# Patient Record
Sex: MALE | Race: WHITE | NOT HISPANIC OR LATINO | Employment: FULL TIME | URBAN - METROPOLITAN AREA
[De-identification: names, ages, dates, MRNs, and addresses within clinical notes are randomized per-mention and may not be internally consistent; named-entity substitution may affect disease eponyms.]

---

## 2024-01-28 ASSESSMENT — PROMIS GLOBAL HEALTH SCALE
CARRYOUT_PHYSICAL_ACTIVITIES: COMPLETELY
RATE_QUALITY_OF_LIFE: EXCELLENT
CARRYOUT_SOCIAL_ACTIVITIES: EXCELLENT
RATE_MENTAL_HEALTH: EXCELLENT
EMOTIONAL_PROBLEMS: NEVER
RATE_AVERAGE_PAIN: 1
RATE_PHYSICAL_HEALTH: EXCELLENT
RATE_GENERAL_HEALTH: EXCELLENT
RATE_SOCIAL_SATISFACTION: EXCELLENT

## 2024-01-29 ENCOUNTER — OFFICE VISIT (OUTPATIENT)
Dept: PRIMARY CARE | Facility: CLINIC | Age: 39
End: 2024-01-29
Payer: COMMERCIAL

## 2024-01-29 VITALS
SYSTOLIC BLOOD PRESSURE: 132 MMHG | DIASTOLIC BLOOD PRESSURE: 90 MMHG | RESPIRATION RATE: 16 BRPM | OXYGEN SATURATION: 100 % | HEIGHT: 70 IN | HEART RATE: 92 BPM | BODY MASS INDEX: 24.77 KG/M2 | WEIGHT: 173 LBS

## 2024-01-29 DIAGNOSIS — R79.89 ABNORMAL LIVER FUNCTION TEST: ICD-10-CM

## 2024-01-29 DIAGNOSIS — I10 PRIMARY HYPERTENSION: ICD-10-CM

## 2024-01-29 DIAGNOSIS — Z00.00 ANNUAL PHYSICAL EXAM: Primary | ICD-10-CM

## 2024-01-29 PROBLEM — H53.149 PHOTOPHOBIA: Status: ACTIVE | Noted: 2024-01-29

## 2024-01-29 PROBLEM — R19.7 DIARRHEA: Status: ACTIVE | Noted: 2024-01-29

## 2024-01-29 PROBLEM — Q55.9 TESTICULAR ASYMMETRY: Status: ACTIVE | Noted: 2024-01-29

## 2024-01-29 PROBLEM — G43.909 MIGRAINE HEADACHE: Status: ACTIVE | Noted: 2024-01-29

## 2024-01-29 PROBLEM — S44.91XA NEUROPRAXIA OF RIGHT UPPER EXTREMITY: Status: ACTIVE | Noted: 2018-01-23

## 2024-01-29 PROBLEM — N50.9 TESTICULAR ABNORMALITY: Status: ACTIVE | Noted: 2024-01-29

## 2024-01-29 PROBLEM — S06.0XAA HEAD CONCUSSION: Status: ACTIVE | Noted: 2024-01-29

## 2024-01-29 PROBLEM — R56.9 SEIZURE (MULTI): Status: ACTIVE | Noted: 2024-01-29

## 2024-01-29 PROBLEM — S00.93XA HEAD CONTUSION: Status: ACTIVE | Noted: 2024-01-29

## 2024-01-29 PROBLEM — J02.9 SORE THROAT: Status: ACTIVE | Noted: 2024-01-29

## 2024-01-29 PROBLEM — R56.9 SEIZURE (MULTI): Status: RESOLVED | Noted: 2024-01-29 | Resolved: 2024-01-29

## 2024-01-29 PROBLEM — S40.011A CONTUSION OF RIGHT SHOULDER: Status: ACTIVE | Noted: 2018-01-23

## 2024-01-29 PROBLEM — R11.0 NAUSEA: Status: ACTIVE | Noted: 2024-01-29

## 2024-01-29 PROBLEM — K90.41 NON-CELIAC GLUTEN SENSITIVITY: Status: ACTIVE | Noted: 2024-01-29

## 2024-01-29 PROBLEM — N50.0 TESTICULAR ATROPHY: Status: ACTIVE | Noted: 2024-01-29

## 2024-01-29 PROBLEM — N50.89 TESTICULAR LUMP: Status: ACTIVE | Noted: 2024-01-29

## 2024-01-29 PROBLEM — J32.9 CHRONIC SINUSITIS: Status: ACTIVE | Noted: 2024-01-29

## 2024-01-29 PROBLEM — K90.0 CELIAC DISEASE (HHS-HCC): Status: ACTIVE | Noted: 2024-01-29

## 2024-01-29 PROBLEM — K52.9 ACUTE COLITIS: Status: ACTIVE | Noted: 2024-01-29

## 2024-01-29 PROBLEM — E29.1 TESTICULAR HYPOFUNCTION: Status: ACTIVE | Noted: 2024-01-29

## 2024-01-29 PROBLEM — S16.1XXA CERVICAL STRAIN: Status: ACTIVE | Noted: 2024-01-29

## 2024-01-29 PROCEDURE — 1036F TOBACCO NON-USER: CPT | Performed by: INTERNAL MEDICINE

## 2024-01-29 PROCEDURE — 99213 OFFICE O/P EST LOW 20 MIN: CPT | Performed by: INTERNAL MEDICINE

## 2024-01-29 PROCEDURE — 3080F DIAST BP >= 90 MM HG: CPT | Performed by: INTERNAL MEDICINE

## 2024-01-29 PROCEDURE — 3075F SYST BP GE 130 - 139MM HG: CPT | Performed by: INTERNAL MEDICINE

## 2024-01-29 PROCEDURE — 99395 PREV VISIT EST AGE 18-39: CPT | Performed by: INTERNAL MEDICINE

## 2024-01-29 RX ORDER — LISINOPRIL 10 MG/1
10 TABLET ORAL DAILY
Qty: 30 TABLET | Refills: 2 | Status: SHIPPED | OUTPATIENT
Start: 2024-01-29 | End: 2024-05-07 | Stop reason: SDUPTHER

## 2024-01-29 ASSESSMENT — ENCOUNTER SYMPTOMS
CONSTITUTIONAL NEGATIVE: 1
GASTROINTESTINAL NEGATIVE: 1
NEUROLOGICAL NEGATIVE: 1
ALLERGIC/IMMUNOLOGIC NEGATIVE: 1
ENDOCRINE NEGATIVE: 1
RESPIRATORY NEGATIVE: 1
MUSCULOSKELETAL NEGATIVE: 1
PSYCHIATRIC NEGATIVE: 1
HEMATOLOGIC/LYMPHATIC NEGATIVE: 1
EYES NEGATIVE: 1
CARDIOVASCULAR NEGATIVE: 1

## 2024-01-29 NOTE — ASSESSMENT & PLAN NOTE
HTN - hypertension not well/controlled .Target BP < 130/80  achieved. Educate low salt diet and exercise with weight loss. Educate home self monitoring and diary keeping. Educate risks of elevate blood pressure and benefits of prompt treatment. Start Lisinopril 10 mg daily

## 2024-01-29 NOTE — PROGRESS NOTES
"Subjective   Patient ID: Mauro Garcia Jr. is a 38 y.o. male who presents for Annual Exam (Physical).    HPI     Review of Systems   Constitutional: Negative.    HENT: Negative.     Eyes: Negative.    Respiratory: Negative.     Cardiovascular: Negative.    Gastrointestinal: Negative.    Endocrine: Negative.    Musculoskeletal: Negative.    Skin: Negative.    Allergic/Immunologic: Negative.    Neurological: Negative.    Hematological: Negative.    Psychiatric/Behavioral: Negative.     All other systems reviewed and are negative.      Objective   /90   Pulse 92   Ht 1.778 m (5' 10\")   Wt 78.5 kg (173 lb)   SpO2 100%   BMI 24.82 kg/m²   Blood pressure 132/90, pulse 92, resp. rate 16, height 1.778 m (5' 10\"), weight 78.5 kg (173 lb), SpO2 100 %.   Physical Exam  Vitals and nursing note reviewed.   Constitutional:       Appearance: Normal appearance.   HENT:      Head: Normocephalic and atraumatic.      Right Ear: Tympanic membrane, ear canal and external ear normal.      Left Ear: Tympanic membrane, ear canal and external ear normal. There is no impacted cerumen.      Nose: Nose normal.      Mouth/Throat:      Mouth: Mucous membranes are moist.      Pharynx: Oropharynx is clear.   Eyes:      Extraocular Movements: Extraocular movements intact.      Conjunctiva/sclera: Conjunctivae normal.      Pupils: Pupils are equal, round, and reactive to light.   Cardiovascular:      Rate and Rhythm: Normal rate and regular rhythm.      Pulses: Normal pulses.      Heart sounds: Normal heart sounds. No murmur heard.  Pulmonary:      Effort: Pulmonary effort is normal. No respiratory distress.      Breath sounds: Normal breath sounds. No stridor. No wheezing, rhonchi or rales.   Chest:      Chest wall: No tenderness.   Abdominal:      General: Abdomen is flat. Bowel sounds are normal. There is no distension.      Palpations: Abdomen is soft. There is no mass.      Tenderness: There is no abdominal tenderness. There is no " right CVA tenderness, left CVA tenderness, guarding or rebound.      Hernia: No hernia is present.   Musculoskeletal:         General: Normal range of motion.      Cervical back: Normal range of motion and neck supple.   Skin:     General: Skin is warm.      Capillary Refill: Capillary refill takes less than 2 seconds.   Neurological:      General: No focal deficit present.      Mental Status: He is alert.      Cranial Nerves: No cranial nerve deficit.      Sensory: No sensory deficit.      Motor: No weakness.      Coordination: Coordination normal.      Gait: Gait normal.      Deep Tendon Reflexes: Reflexes normal.   Psychiatric:         Mood and Affect: Mood normal.         Behavior: Behavior normal. Behavior is cooperative.         Thought Content: Thought content normal.         Judgment: Judgment normal.         Assessment/Plan   Problem List Items Addressed This Visit             ICD-10-CM    Abnormal liver function test R79.89     Monitor liver function and monitor US and          Relevant Orders    Comprehensive Metabolic Panel    Annual physical exam - Primary Z00.00     No recent hospitalizations.    All medications reviewed and reconciled by me the provider..  No use of controlled substances or opiates.    Family history, social history reviewed, no changes.    Patient does not smoke.    Patient does not drink.    Patient hydrates adequately daily.  Eats a well-balanced healthy diet.     Exercises adequately including walking and doing weightbearing exercises.    Patient denies any difficulty with memory or cognition.     Denies any difficulty with hearing.  Patient does not wear hearing aids.    No fall risk.  No recent falls.  Denies any difficulty walking.    Patient with no history of depression anxiety, denies any loss of interest, no feeling of sadness, no lack of motivation.    Patient is independent in all ADLs and IADLs.  Independent bathing, dressing, walking.  Takes care of own finances,  shopping and cooking.     End-of-life decision-making power of  reviewed with patient.     Risk Factors Identified During Visit: None.   Influenza: influenza vaccine was previously given.   Pneumovax 23: Pneumovax 23 vaccine was previously given.   Prevnar 13: Prevnar 13 vaccine was previously given.   Shingles Vaccine: Shingles vaccine was previously given.   Prostate cancer screening: Screening is current.   Colorectal Cancer Screening: screening is current.   Abdominal Aortic Aneurysm screening: screening is current.   HIV screening: screening not indicated.       Preventive measures - Recommend ASAP : . Colonoscopy (educate patient risks of colon cancer) refer patient to GI specialist. Ophthalmology and retina exam recommend yearly exams refer patient to an Ophthalmologist. BPH - (Benign Prostatic Hypertrophy) refer patient to an Urologist for rectal exam and PSA check.          Relevant Orders    CBC and Auto Differential    Lipid Panel    TSH with reflex to Free T4 if abnormal    Prostate Specific Antigen    Primary hypertension I10     HTN - hypertension not well/controlled .Target BP < 130/80  achieved. Educate low salt diet and exercise with weight loss. Educate home self monitoring and diary keeping. Educate risks of elevate blood pressure and benefits of prompt treatment. Start Lisinopril 10 mg daily          Relevant Medications    lisinopril 10 mg tablet    Other Relevant Orders    Comprehensive Metabolic Panel

## 2024-01-29 NOTE — ASSESSMENT & PLAN NOTE
No recent hospitalizations.    All medications reviewed and reconciled by me the provider..  No use of controlled substances or opiates.    Family history, social history reviewed, no changes.    Patient does not smoke.    Patient does not drink.    Patient hydrates adequately daily.  Eats a well-balanced healthy diet.     Exercises adequately including walking and doing weightbearing exercises.    Patient denies any difficulty with memory or cognition.     Denies any difficulty with hearing.  Patient does not wear hearing aids.    No fall risk.  No recent falls.  Denies any difficulty walking.    Patient with no history of depression anxiety, denies any loss of interest, no feeling of sadness, no lack of motivation.    Patient is independent in all ADLs and IADLs.  Independent bathing, dressing, walking.  Takes care of own finances, shopping and cooking.     End-of-life decision-making power of  reviewed with patient.     Risk Factors Identified During Visit: None.   Influenza: influenza vaccine was previously given.   Pneumovax 23: Pneumovax 23 vaccine was previously given.   Prevnar 13: Prevnar 13 vaccine was previously given.   Shingles Vaccine: Shingles vaccine was previously given.   Prostate cancer screening: Screening is current.   Colorectal Cancer Screening: screening is current.   Abdominal Aortic Aneurysm screening: screening is current.   HIV screening: screening not indicated.       Preventive measures - Recommend ASAP : . Colonoscopy (educate patient risks of colon cancer) refer patient to GI specialist. Ophthalmology and retina exam recommend yearly exams refer patient to an Ophthalmologist. BPH - (Benign Prostatic Hypertrophy) refer patient to an Urologist for rectal exam and PSA check.

## 2024-01-31 ENCOUNTER — LAB (OUTPATIENT)
Dept: LAB | Facility: LAB | Age: 39
End: 2024-01-31
Payer: COMMERCIAL

## 2024-01-31 DIAGNOSIS — I10 PRIMARY HYPERTENSION: ICD-10-CM

## 2024-01-31 DIAGNOSIS — Z00.00 ANNUAL PHYSICAL EXAM: ICD-10-CM

## 2024-01-31 DIAGNOSIS — R79.89 ABNORMAL LIVER FUNCTION TEST: ICD-10-CM

## 2024-01-31 LAB
ALBUMIN SERPL BCP-MCNC: 4.6 G/DL (ref 3.4–5)
ALP SERPL-CCNC: 64 U/L (ref 33–120)
ALT SERPL W P-5'-P-CCNC: 20 U/L (ref 10–52)
ANION GAP SERPL CALC-SCNC: 10 MMOL/L (ref 10–20)
AST SERPL W P-5'-P-CCNC: 19 U/L (ref 9–39)
BASOPHILS # BLD AUTO: 0.04 X10*3/UL (ref 0–0.1)
BASOPHILS NFR BLD AUTO: 0.6 %
BILIRUB SERPL-MCNC: 0.7 MG/DL (ref 0–1.2)
BUN SERPL-MCNC: 14 MG/DL (ref 6–23)
CALCIUM SERPL-MCNC: 9.8 MG/DL (ref 8.6–10.6)
CHLORIDE SERPL-SCNC: 104 MMOL/L (ref 98–107)
CHOLEST SERPL-MCNC: 126 MG/DL (ref 0–199)
CHOLESTEROL/HDL RATIO: 2.9
CO2 SERPL-SCNC: 29 MMOL/L (ref 21–32)
CREAT SERPL-MCNC: 0.93 MG/DL (ref 0.5–1.3)
EGFRCR SERPLBLD CKD-EPI 2021: >90 ML/MIN/1.73M*2
EOSINOPHIL # BLD AUTO: 0.08 X10*3/UL (ref 0–0.7)
EOSINOPHIL NFR BLD AUTO: 1.3 %
ERYTHROCYTE [DISTWIDTH] IN BLOOD BY AUTOMATED COUNT: 11.9 % (ref 11.5–14.5)
GLUCOSE SERPL-MCNC: 80 MG/DL (ref 74–99)
HCT VFR BLD AUTO: 44.9 % (ref 41–52)
HDLC SERPL-MCNC: 44.1 MG/DL
HGB BLD-MCNC: 15 G/DL (ref 13.5–17.5)
IMM GRANULOCYTES # BLD AUTO: 0.01 X10*3/UL (ref 0–0.7)
IMM GRANULOCYTES NFR BLD AUTO: 0.2 % (ref 0–0.9)
LDLC SERPL CALC-MCNC: 73 MG/DL
LYMPHOCYTES # BLD AUTO: 1.72 X10*3/UL (ref 1.2–4.8)
LYMPHOCYTES NFR BLD AUTO: 27.5 %
MCH RBC QN AUTO: 28.7 PG (ref 26–34)
MCHC RBC AUTO-ENTMCNC: 33.4 G/DL (ref 32–36)
MCV RBC AUTO: 86 FL (ref 80–100)
MONOCYTES # BLD AUTO: 0.43 X10*3/UL (ref 0.1–1)
MONOCYTES NFR BLD AUTO: 6.9 %
NEUTROPHILS # BLD AUTO: 3.97 X10*3/UL (ref 1.2–7.7)
NEUTROPHILS NFR BLD AUTO: 63.5 %
NON HDL CHOLESTEROL: 82 MG/DL (ref 0–149)
NRBC BLD-RTO: 0 /100 WBCS (ref 0–0)
PLATELET # BLD AUTO: 208 X10*3/UL (ref 150–450)
POTASSIUM SERPL-SCNC: 4.3 MMOL/L (ref 3.5–5.3)
PROT SERPL-MCNC: 7 G/DL (ref 6.4–8.2)
PSA SERPL-MCNC: 0.9 NG/ML
RBC # BLD AUTO: 5.22 X10*6/UL (ref 4.5–5.9)
SODIUM SERPL-SCNC: 139 MMOL/L (ref 136–145)
TRIGL SERPL-MCNC: 45 MG/DL (ref 0–149)
TSH SERPL-ACNC: 1.72 MIU/L (ref 0.44–3.98)
VLDL: 9 MG/DL (ref 0–40)
WBC # BLD AUTO: 6.3 X10*3/UL (ref 4.4–11.3)

## 2024-01-31 PROCEDURE — 80061 LIPID PANEL: CPT

## 2024-01-31 PROCEDURE — 80053 COMPREHEN METABOLIC PANEL: CPT

## 2024-01-31 PROCEDURE — 36415 COLL VENOUS BLD VENIPUNCTURE: CPT

## 2024-01-31 PROCEDURE — 84153 ASSAY OF PSA TOTAL: CPT

## 2024-01-31 PROCEDURE — 85025 COMPLETE CBC W/AUTO DIFF WBC: CPT

## 2024-01-31 PROCEDURE — 84443 ASSAY THYROID STIM HORMONE: CPT

## 2024-05-07 DIAGNOSIS — I10 PRIMARY HYPERTENSION: ICD-10-CM

## 2024-05-07 RX ORDER — LISINOPRIL 10 MG/1
10 TABLET ORAL DAILY
Qty: 30 TABLET | Refills: 1 | Status: SHIPPED | OUTPATIENT
Start: 2024-05-07

## 2024-08-01 DIAGNOSIS — I10 PRIMARY HYPERTENSION: ICD-10-CM

## 2024-08-01 RX ORDER — LISINOPRIL 10 MG/1
10 TABLET ORAL DAILY
Qty: 14 TABLET | Refills: 0 | Status: SHIPPED | OUTPATIENT
Start: 2024-08-01

## 2024-08-10 ASSESSMENT — ENCOUNTER SYMPTOMS
BLURRED VISION: 0
ORTHOPNEA: 0
SWEATS: 0
HYPERTENSION: 1
SHORTNESS OF BREATH: 0
NECK PAIN: 0
PALPITATIONS: 0
PND: 0
HEADACHES: 0

## 2024-08-12 ENCOUNTER — APPOINTMENT (OUTPATIENT)
Dept: PRIMARY CARE | Facility: CLINIC | Age: 39
End: 2024-08-12
Payer: COMMERCIAL

## 2024-08-12 VITALS
WEIGHT: 167.55 LBS | DIASTOLIC BLOOD PRESSURE: 75 MMHG | SYSTOLIC BLOOD PRESSURE: 120 MMHG | BODY MASS INDEX: 23.99 KG/M2 | HEART RATE: 72 BPM | HEIGHT: 70 IN | RESPIRATION RATE: 17 BRPM

## 2024-08-12 DIAGNOSIS — E29.1 TESTICULAR HYPOFUNCTION: ICD-10-CM

## 2024-08-12 DIAGNOSIS — I10 PRIMARY HYPERTENSION: Primary | ICD-10-CM

## 2024-08-12 PROBLEM — R79.89 ABNORMAL LIVER FUNCTION TEST: Status: RESOLVED | Noted: 2024-01-29 | Resolved: 2024-08-12

## 2024-08-12 PROBLEM — M54.50 CHRONIC RIGHT-SIDED LOW BACK PAIN: Status: ACTIVE | Noted: 2024-04-24

## 2024-08-12 PROBLEM — S06.0XAA HEAD CONCUSSION: Status: RESOLVED | Noted: 2024-01-29 | Resolved: 2024-08-12

## 2024-08-12 PROBLEM — G89.29 CHRONIC RIGHT-SIDED LOW BACK PAIN: Status: ACTIVE | Noted: 2024-04-24

## 2024-08-12 PROBLEM — K90.41 NON-CELIAC GLUTEN SENSITIVITY: Status: RESOLVED | Noted: 2024-01-29 | Resolved: 2024-08-12

## 2024-08-12 PROBLEM — R19.7 DIARRHEA: Status: RESOLVED | Noted: 2024-01-29 | Resolved: 2024-08-12

## 2024-08-12 PROBLEM — J02.9 SORE THROAT: Status: RESOLVED | Noted: 2024-01-29 | Resolved: 2024-08-12

## 2024-08-12 PROBLEM — K90.0 CELIAC DISEASE (HHS-HCC): Status: RESOLVED | Noted: 2024-01-29 | Resolved: 2024-08-12

## 2024-08-12 PROBLEM — R11.0 NAUSEA: Status: RESOLVED | Noted: 2024-01-29 | Resolved: 2024-08-12

## 2024-08-12 PROBLEM — S16.1XXA CERVICAL STRAIN: Status: RESOLVED | Noted: 2024-01-29 | Resolved: 2024-08-12

## 2024-08-12 PROCEDURE — 1036F TOBACCO NON-USER: CPT | Performed by: INTERNAL MEDICINE

## 2024-08-12 PROCEDURE — 3074F SYST BP LT 130 MM HG: CPT | Performed by: INTERNAL MEDICINE

## 2024-08-12 PROCEDURE — 3008F BODY MASS INDEX DOCD: CPT | Performed by: INTERNAL MEDICINE

## 2024-08-12 PROCEDURE — 99213 OFFICE O/P EST LOW 20 MIN: CPT | Performed by: INTERNAL MEDICINE

## 2024-08-12 PROCEDURE — 3078F DIAST BP <80 MM HG: CPT | Performed by: INTERNAL MEDICINE

## 2024-08-12 RX ORDER — LISINOPRIL 10 MG/1
10 TABLET ORAL DAILY
Qty: 14 TABLET | Refills: 0 | Status: SHIPPED | OUTPATIENT
Start: 2024-08-12

## 2024-08-12 ASSESSMENT — ENCOUNTER SYMPTOMS
EYES NEGATIVE: 1
PND: 0
HEADACHES: 0
NEUROLOGICAL NEGATIVE: 1
PALPITATIONS: 0
BLURRED VISION: 0
ALLERGIC/IMMUNOLOGIC NEGATIVE: 1
MUSCULOSKELETAL NEGATIVE: 1
SWEATS: 0
HEMATOLOGIC/LYMPHATIC NEGATIVE: 1
RESPIRATORY NEGATIVE: 1
ENDOCRINE NEGATIVE: 1
HYPERTENSION: 1
PSYCHIATRIC NEGATIVE: 1
ORTHOPNEA: 0
CARDIOVASCULAR NEGATIVE: 1
GASTROINTESTINAL NEGATIVE: 1
CONSTITUTIONAL NEGATIVE: 1
NECK PAIN: 0
SHORTNESS OF BREATH: 0

## 2024-08-12 NOTE — PROGRESS NOTES
"Subjective   Patient ID: Mauro Garcia Jr. is a 39 y.o. male who presents for Follow-up (Follow up for refills/Inflamed glad on right side of neck).    Hypertension  This is a recurrent problem. The current episode started more than 1 month ago. The problem has been gradually improving since onset. The problem is controlled. Pertinent negatives include no anxiety, blurred vision, chest pain, headaches, malaise/fatigue, neck pain, orthopnea, palpitations, peripheral edema, PND, shortness of breath or sweats. Agents associated with hypertension include decongestants. There are no known risk factors for coronary artery disease. There are no compliance problems.         Review of Systems   Constitutional: Negative.  Negative for malaise/fatigue.   HENT: Negative.     Eyes: Negative.  Negative for blurred vision.   Respiratory: Negative.  Negative for shortness of breath.    Cardiovascular: Negative.  Negative for chest pain, palpitations, orthopnea and PND.   Gastrointestinal: Negative.    Endocrine: Negative.    Musculoskeletal: Negative.  Negative for neck pain.   Skin: Negative.    Allergic/Immunologic: Negative.    Neurological: Negative.  Negative for headaches.   Hematological: Negative.    Psychiatric/Behavioral: Negative.     All other systems reviewed and are negative.      Objective   Resp 17   Ht 1.778 m (5' 10\")   Wt 76 kg (167 lb 8.8 oz)   BMI 24.04 kg/m²   Resp. rate 17, height 1.778 m (5' 10\"), weight 76 kg (167 lb 8.8 oz).   Blood pressure 120/75, pulse 72, resp. rate 17, height 1.778 m (5' 10\"), weight 76 kg (167 lb 8.8 oz).   Physical Exam  Vitals and nursing note reviewed.   Constitutional:       Appearance: Normal appearance.   HENT:      Head: Normocephalic and atraumatic.      Right Ear: Tympanic membrane, ear canal and external ear normal.      Left Ear: Tympanic membrane, ear canal and external ear normal. There is no impacted cerumen.      Nose: Nose normal.      Mouth/Throat:      Mouth: " Mucous membranes are moist.      Pharynx: Oropharynx is clear.   Eyes:      Extraocular Movements: Extraocular movements intact.      Conjunctiva/sclera: Conjunctivae normal.      Pupils: Pupils are equal, round, and reactive to light.   Cardiovascular:      Rate and Rhythm: Normal rate and regular rhythm.      Pulses: Normal pulses.      Heart sounds: Normal heart sounds. No murmur heard.  Pulmonary:      Effort: Pulmonary effort is normal. No respiratory distress.      Breath sounds: Normal breath sounds. No stridor. No wheezing, rhonchi or rales.   Chest:      Chest wall: No tenderness.   Abdominal:      General: Abdomen is flat. Bowel sounds are normal. There is no distension.      Palpations: Abdomen is soft. There is no mass.      Tenderness: There is no abdominal tenderness. There is no right CVA tenderness, left CVA tenderness, guarding or rebound.      Hernia: No hernia is present.   Musculoskeletal:         General: Normal range of motion.      Cervical back: Normal range of motion and neck supple.   Skin:     General: Skin is warm.      Capillary Refill: Capillary refill takes less than 2 seconds.   Neurological:      General: No focal deficit present.      Mental Status: He is alert.      Cranial Nerves: No cranial nerve deficit.      Sensory: No sensory deficit.      Motor: No weakness.      Coordination: Coordination normal.      Gait: Gait normal.      Deep Tendon Reflexes: Reflexes normal.   Psychiatric:         Mood and Affect: Mood normal.         Behavior: Behavior normal. Behavior is cooperative.         Thought Content: Thought content normal.         Judgment: Judgment normal.         Assessment/Plan   Problem List Items Addressed This Visit             ICD-10-CM    Testicular hypofunction E29.1     Monitor Testosterone level         Primary hypertension - Primary I10     HTN - hypertension not well/controlled .Target BP < 130/80  achieved. Educate low salt diet and exercise with weight loss.  Educate home self monitoring and diary keeping. Educate risks of elevate blood pressure and benefits of prompt treatment. Start Lisinopril 10 mg daily          Relevant Medications    lisinopril 10 mg tablet

## 2024-09-09 DIAGNOSIS — I10 PRIMARY HYPERTENSION: ICD-10-CM

## 2024-09-09 RX ORDER — LISINOPRIL 10 MG/1
10 TABLET ORAL DAILY
Qty: 90 TABLET | Refills: 1 | Status: SHIPPED | OUTPATIENT
Start: 2024-09-09

## 2025-05-22 ENCOUNTER — APPOINTMENT (OUTPATIENT)
Dept: CARDIOLOGY | Facility: HOSPITAL | Age: 40
End: 2025-05-22
Payer: COMMERCIAL

## 2025-05-22 ENCOUNTER — HOSPITAL ENCOUNTER (EMERGENCY)
Facility: HOSPITAL | Age: 40
Discharge: HOME | End: 2025-05-22
Payer: COMMERCIAL

## 2025-05-22 ENCOUNTER — APPOINTMENT (OUTPATIENT)
Dept: RADIOLOGY | Facility: HOSPITAL | Age: 40
End: 2025-05-22
Payer: COMMERCIAL

## 2025-05-22 VITALS
HEART RATE: 83 BPM | BODY MASS INDEX: 25.18 KG/M2 | RESPIRATION RATE: 16 BRPM | HEIGHT: 69 IN | OXYGEN SATURATION: 98 % | DIASTOLIC BLOOD PRESSURE: 105 MMHG | WEIGHT: 170 LBS | SYSTOLIC BLOOD PRESSURE: 128 MMHG | TEMPERATURE: 97.7 F

## 2025-05-22 DIAGNOSIS — I48.0 PAROXYSMAL ATRIAL FIBRILLATION (MULTI): Primary | ICD-10-CM

## 2025-05-22 LAB
ALBUMIN SERPL BCP-MCNC: 4.1 G/DL (ref 3.4–5)
ALP SERPL-CCNC: 62 U/L (ref 33–120)
ALT SERPL W P-5'-P-CCNC: 17 U/L (ref 10–52)
ANION GAP SERPL CALC-SCNC: 12 MMOL/L (ref 10–20)
AST SERPL W P-5'-P-CCNC: 23 U/L (ref 9–39)
BASOPHILS # BLD AUTO: 0.05 X10*3/UL (ref 0–0.1)
BASOPHILS NFR BLD AUTO: 0.9 %
BILIRUB SERPL-MCNC: 0.7 MG/DL (ref 0–1.2)
BODY SURFACE AREA: 1.94 M2
BUN SERPL-MCNC: 21 MG/DL (ref 6–23)
CALCIUM SERPL-MCNC: 8.6 MG/DL (ref 8.6–10.3)
CARDIAC TROPONIN I PNL SERPL HS: 4 NG/L (ref 0–20)
CHLORIDE SERPL-SCNC: 109 MMOL/L (ref 98–107)
CO2 SERPL-SCNC: 24 MMOL/L (ref 21–32)
CREAT SERPL-MCNC: 0.95 MG/DL (ref 0.5–1.3)
D DIMER PPP FEU-MCNC: <215 NG/ML FEU
EGFRCR SERPLBLD CKD-EPI 2021: >90 ML/MIN/1.73M*2
EOSINOPHIL # BLD AUTO: 0.1 X10*3/UL (ref 0–0.7)
EOSINOPHIL NFR BLD AUTO: 1.8 %
ERYTHROCYTE [DISTWIDTH] IN BLOOD BY AUTOMATED COUNT: 11.9 % (ref 11.5–14.5)
GLUCOSE SERPL-MCNC: 89 MG/DL (ref 74–99)
HCT VFR BLD AUTO: 45.1 % (ref 41–52)
HGB BLD-MCNC: 14.9 G/DL (ref 13.5–17.5)
IMM GRANULOCYTES # BLD AUTO: 0.02 X10*3/UL (ref 0–0.7)
IMM GRANULOCYTES NFR BLD AUTO: 0.4 % (ref 0–0.9)
INR PPP: 1 (ref 0.9–1.1)
LYMPHOCYTES # BLD AUTO: 1.89 X10*3/UL (ref 1.2–4.8)
LYMPHOCYTES NFR BLD AUTO: 33.8 %
MAGNESIUM SERPL-MCNC: 2.09 MG/DL (ref 1.6–2.4)
MCH RBC QN AUTO: 28.8 PG (ref 26–34)
MCHC RBC AUTO-ENTMCNC: 33 G/DL (ref 32–36)
MCV RBC AUTO: 87 FL (ref 80–100)
MONOCYTES # BLD AUTO: 0.47 X10*3/UL (ref 0.1–1)
MONOCYTES NFR BLD AUTO: 8.4 %
NEUTROPHILS # BLD AUTO: 3.06 X10*3/UL (ref 1.2–7.7)
NEUTROPHILS NFR BLD AUTO: 54.7 %
NRBC BLD-RTO: 0 /100 WBCS (ref 0–0)
PLATELET # BLD AUTO: 188 X10*3/UL (ref 150–450)
POTASSIUM SERPL-SCNC: 4.4 MMOL/L (ref 3.5–5.3)
PROT SERPL-MCNC: 6.2 G/DL (ref 6.4–8.2)
PROTHROMBIN TIME: 10.6 SECONDS (ref 9.8–12.4)
RBC # BLD AUTO: 5.18 X10*6/UL (ref 4.5–5.9)
SODIUM SERPL-SCNC: 141 MMOL/L (ref 136–145)
TSH SERPL-ACNC: 1.86 MIU/L (ref 0.44–3.98)
WBC # BLD AUTO: 5.6 X10*3/UL (ref 4.4–11.3)

## 2025-05-22 PROCEDURE — 71045 X-RAY EXAM CHEST 1 VIEW: CPT

## 2025-05-22 PROCEDURE — 99285 EMERGENCY DEPT VISIT HI MDM: CPT

## 2025-05-22 PROCEDURE — 85025 COMPLETE CBC W/AUTO DIFF WBC: CPT | Performed by: NURSE PRACTITIONER

## 2025-05-22 PROCEDURE — 2500000001 HC RX 250 WO HCPCS SELF ADMINISTERED DRUGS (ALT 637 FOR MEDICARE OP): Performed by: NURSE PRACTITIONER

## 2025-05-22 PROCEDURE — 85379 FIBRIN DEGRADATION QUANT: CPT | Performed by: NURSE PRACTITIONER

## 2025-05-22 PROCEDURE — 84443 ASSAY THYROID STIM HORMONE: CPT | Performed by: NURSE PRACTITIONER

## 2025-05-22 PROCEDURE — 84484 ASSAY OF TROPONIN QUANT: CPT | Performed by: NURSE PRACTITIONER

## 2025-05-22 PROCEDURE — 71045 X-RAY EXAM CHEST 1 VIEW: CPT | Mod: FOREIGN READ | Performed by: RADIOLOGY

## 2025-05-22 PROCEDURE — 85610 PROTHROMBIN TIME: CPT | Performed by: NURSE PRACTITIONER

## 2025-05-22 PROCEDURE — 93246 EXT ECG>7D<15D RECORDING: CPT

## 2025-05-22 PROCEDURE — 84075 ASSAY ALKALINE PHOSPHATASE: CPT | Performed by: NURSE PRACTITIONER

## 2025-05-22 PROCEDURE — 93005 ELECTROCARDIOGRAM TRACING: CPT

## 2025-05-22 PROCEDURE — 36415 COLL VENOUS BLD VENIPUNCTURE: CPT | Performed by: NURSE PRACTITIONER

## 2025-05-22 PROCEDURE — 83735 ASSAY OF MAGNESIUM: CPT | Performed by: NURSE PRACTITIONER

## 2025-05-22 RX ORDER — NAPROXEN SODIUM 220 MG/1
81 TABLET, FILM COATED ORAL ONCE
Status: COMPLETED | OUTPATIENT
Start: 2025-05-22 | End: 2025-05-22

## 2025-05-22 RX ADMIN — ASPIRIN 81 MG: 81 TABLET, CHEWABLE ORAL at 10:12

## 2025-05-22 ASSESSMENT — COLUMBIA-SUICIDE SEVERITY RATING SCALE - C-SSRS
6. HAVE YOU EVER DONE ANYTHING, STARTED TO DO ANYTHING, OR PREPARED TO DO ANYTHING TO END YOUR LIFE?: NO
1. IN THE PAST MONTH, HAVE YOU WISHED YOU WERE DEAD OR WISHED YOU COULD GO TO SLEEP AND NOT WAKE UP?: NO
2. HAVE YOU ACTUALLY HAD ANY THOUGHTS OF KILLING YOURSELF?: NO

## 2025-05-22 ASSESSMENT — HEART SCORE
TROPONIN: LESS THAN OR EQUAL TO NORMAL LIMIT
HISTORY: SLIGHTLY SUSPICIOUS
HEART SCORE: 2
AGE: <45
ECG: NON-SPECIFIC REPOLARIZATION DISTURBANCE
RISK FACTORS: 1-2 RISK FACTORS

## 2025-05-22 ASSESSMENT — PAIN SCALES - GENERAL: PAINLEVEL_OUTOF10: 2

## 2025-05-22 ASSESSMENT — PAIN - FUNCTIONAL ASSESSMENT: PAIN_FUNCTIONAL_ASSESSMENT: 0-10

## 2025-05-22 ASSESSMENT — PAIN DESCRIPTION - LOCATION: LOCATION: CHEST

## 2025-05-22 ASSESSMENT — PAIN DESCRIPTION - DESCRIPTORS: DESCRIPTORS: DISCOMFORT

## 2025-05-22 ASSESSMENT — PAIN DESCRIPTION - PAIN TYPE: TYPE: ACUTE PAIN

## 2025-05-22 NOTE — ED TRIAGE NOTES
Pt arrives to ED with new onset afib RVR. States his watch woke him up alerting him of a high heart rate. Endorses palpitations, denies CP/SOB. Stopped taking antihyptertensives last year.

## 2025-05-22 NOTE — Clinical Note
Mauro Garcia was seen and treated in our emergency department on 5/22/2025.  He may return to work on 05/23/2025.       If you have any questions or concerns, please don't hesitate to call.      Malcom Majano, APRN-CNP

## 2025-05-22 NOTE — ED PROVIDER NOTES
"HPI   Chief Complaint   Patient presents with    Irregular Heart Beat       39-year-old  he has a history of hypertension but stopped his lisinopril 1 year ago and try to control his blood pressure with diet and exercise presents today with new onset atrial fibs RVR that started at 1 AM this morning.  [facility] and [facility] confirmed atrial fibs RVR at a rate between 150 down to 110 bpm.  There was no ST elevation or depression and there was an occasional PVC.  He states that he was experiencing \"chest pressure\" that woke him up at 1 AM.  He denies cough.  He denies fever.  He denies any chills.  He denies history of smoking.  He only drinks on rare occasion.  He does not use energy drinks.  He has no history of hyperthyroidism.  He denies nausea or vomiting.  He denies becoming diaphoretic.  He denies any recent trauma or fall.  He denies melena or hematochezia.  He was hypertensive when he arrived with a blood pressure 131/104.  He was afebrile.  His pulse ox was 99% he was denying dyspnea or dyspnea on exertion.      History provided by:  Patient and EMS personnel   used: No            Patient History   Medical History[1]  Surgical History[2]  Family History[3]  Social History[4]    Physical Exam   ED Triage Vitals [05/22/25 0753]   Temperature Heart Rate Respirations BP   36.5 °C (97.7 °F) 75 18 (!) 131/104      Pulse Ox Temp Source Heart Rate Source Patient Position   99 % Oral -- --      BP Location FiO2 (%)     -- --       Physical Exam  Constitutional:       Appearance: Normal appearance.   HENT:      Head: Normocephalic and atraumatic.   Cardiovascular:      Rate and Rhythm: Normal rate and regular rhythm.      Pulses: Normal pulses.      Heart sounds: Normal heart sounds.   Pulmonary:      Effort: Pulmonary effort is normal.      Breath sounds: Normal breath sounds.   Abdominal:      General: Abdomen is flat.      Palpations: Abdomen is soft.   Musculoskeletal:   "       General: Normal range of motion.      Cervical back: Normal range of motion and neck supple.   Skin:     General: Skin is warm.      Capillary Refill: Capillary refill takes less than 2 seconds.   Neurological:      General: No focal deficit present.      Mental Status: He is alert and oriented to person, place, and time.   Psychiatric:         Mood and Affect: Mood normal.         Behavior: Behavior normal.           ED Course & MDM   Diagnoses as of 05/22/25 0952   Paroxysmal atrial fibrillation (Multi)                 No data recorded     [place] Coma Scale Score: 15 (05/22/25 0825 : Kyra Kathryn Behnfeldt, RN) HEART Score: 2 (05/22/25 0802 : Malcom Majano, APRN-CNP)                         Medical Decision Making  Patient cardioverted when we brought him in room 3.  His auscultated lung sounds were normal and he was normal S1-S2 and rhythm.  We completed an EKG and it was 87 bpm normal sinus rhythm.  There was no ST elevation or depression QRS was narrow and P waves were tied to the QRS.  A chest x-ray was ordered due to his complaint of chest pressure.  TSH, magnesium, CBC CMP troponin and D-dimer ordered with differentials concerning for electrolyte imbalance, upper respiratory infection the could have brought the atrial fib on, heart damage with a history of cardiomyopathy, and patient is presently not anticoagulated.    Vital signs were monitored and his blood pressure went from 131/104 to 123/90 and then to 114/88.  Heart rate was normal at 79 bpm and he remained in normal sinus rhythm.  He remained afebrile and his pulse ox was 98% on room air.    His TSH was normal.  His troponin was normal.  His metabolic panel was normal.  His magnesium was normal.  His CBC was normal.  D-dimer was undetectable.    Chest x-ray showed no acute findings single AP view of the chest.  There was no cardiomegaly appreciated.  There was no widening mediastinum.    I contacted Dr. Edwardo Ellis on-call cardiology for  consult    I ordered a Holter monitor placed on patient for the next 2 weeks.    I was advised by patient that Dr. Edwardo Ellis also cares for his father, and his father has a history of atrial fib.    CHADVASC score 1, held of on Anticoagulant. Recommended 81 mg aspirin daily    Dr Ellis will get patient into his office in the next 1 to 2 weeks    Amount and/or Complexity of Data Reviewed  Labs: ordered.  Radiology: ordered and independent interpretation performed.  ECG/medicine tests: ordered and independent interpretation performed.     Details: Initial EKG was sent over by the EMS squad with an average heart rate of 125 bpm atrial fibs with RVR occasional PVCs.    Her EKG was completed at 7:50 AM at 87 bpm normal sinus rhythm normal axis normal WV interval normal QT corrected interpreted both by attending and myself no ST elevation or depression.        Procedure  Procedures         [1]   Past Medical History:  Diagnosis Date    Personal history of traumatic brain injury     History of concussion   [2]   Past Surgical History:  Procedure Laterality Date    OTHER SURGICAL HISTORY  09/05/2014    Wrist Surgery    RHINOPLASTY  09/05/2014    Rhinoplasty   [3] No family history on file.  [4]   Social History  Tobacco Use    Smoking status: Never    Smokeless tobacco: Never   Substance Use Topics    Alcohol use: Not Currently    Drug use: Never        Malcom Majano, GAVIN-MARTIN  05/22/25 0952

## 2025-05-22 NOTE — DISCHARGE INSTRUCTIONS
Please take 81 mg chewable aspirin daily.  Dr. Ellis will get you in his office in the next 1 to 2 weeks.  Please keep monitor on for the next 2 weeks.

## 2025-05-23 NOTE — PROGRESS NOTES
"Primary Care Physician: No primary care provider on file.  Date of Visit: 05/27/2025  3:40 PM EDT  Location of visit: [facility] [address]     Chief Complaint:   Hospital follow up     HPI / Summary:   Mauro Garcia Jr. is a 39 y.o. male presents for followup.  He has a history of borderline HTN and paroxysmal atrial fibrillation    His recent history dates back to early 2024 in which he was overweight 200 pounds and began having hypertension.  He was put on lisinopril 10 mg but developed some nausea lightheadedness and dizziness and discontinued this.  He then made radical lifestyle changes including removing processed food, low-salt diet, regular exercise, alcohol cessation and since then has been able to control his blood pressure much better with systolics typically 130s/80s.    On the evening of 5/21/2025 he reported coming home from work, feeling very hungry and decided to have chicken tenders and fresh vegetables about 30 minutes before going to sleep.  Says this is the first processed food he has had in months.  He woke up a few hours later to his Apple watch alarming stating atrial fibrillation.  He thought it might be an accurate so he went back to sleep.  He then woke up a few hours later with persistent atrial fibrillation.  He noted some mild chest abnormality.  No significant heaviness or palpitations.  He then went to work and EKG at the [facility] showed A-fib with RVR and was brought to [facility].  He says while at [facility] about 20 minutes later he spontaneously converted to normal rhythm and felt a \"relief/relaxation\" in his chest.  EKG at that point showed normal sinus rhythm.  Workup in the ER negative including normal TSH, troponin, CMP, D-dimer and CBC.    2-week monitor was placed  Denies any further A-fib episodes and no further alarms from his Apple Watch      I also care for his father who has paroxysmal SVT with a prior ablation, mildly enlarged ascending aorta          Last Cardiology " Tests:      EC25 Normal sinus rhythm         Echo:      Cath:      Stress Test:      Cardiac Imaging:    Past Medical History:  Medical History[1]     Past Surgical History:  Surgical History[2]       Social History:  He reports that he has never smoked. He has never used smokeless tobacco. He reports that he does not currently use alcohol. He reports that he does not use drugs.    Family History:  family history is not on file.      Allergies:  RX Allergies[3]    Outpatient Medications:  No current outpatient medications      Physical Exam:  GENERAL: alert, cooperative, pleasant, in no acute distress  SKIN: warm, dry, no rash.  NECK: no JVD, no STEPHANIE  CARDIAC: Regular rate and rhythm with no rubs, murmurs, or gallops  CHEST: Normal respiratory efforts, lungs clear to auscultation bilaterally.  ABDOMEN: soft, nontender, nondistended  EXTREMITIES: no edema  NEURO: Alert and oriented x 3.  Grossly normal.  Moves all 4 extremities.    Vitals:    25 1522   BP: 122/85   BP Location: Left arm   Pulse: 92   SpO2: 95%   Weight: 74.4 kg (164 lb)     Wt Readings from Last 5 Encounters:   25 77.1 kg (170 lb)   24 76 kg (167 lb 8.8 oz)   24 78.5 kg (173 lb)   20 79.8 kg (176 lb)     Body mass index is 24.22 kg/m².        Last Labs:  CMP:  Recent Labs     25  0757 24  0707 20  0840    139 138   K 4.4 4.3 4.5   * 104 104   CO2 24 29 27   ANIONGAP 12 10 12   BUN 21 14 19   CREATININE 0.95 0.93 0.90   EGFR >90 >90  --    GLUCOSE 89 80 90     Recent Labs     25  0757 24  0707 20  0840   ALBUMIN 4.1 4.6 4.6   ALKPHOS 62 64 55   ALT 17 20 29   AST 23 19 24   BILITOT 0.7 0.7 1.0     CBC:  Recent Labs     25  0757 24  0707 20  0840   WBC 5.6 6.3 5.2   HGB 14.9 15.0 15.5   HCT 45.1 44.9 45.3    208 200   MCV 87 86 84     COAG:   Recent Labs     25  0757   INR 1.0   DDIMERVTE <215     ENDO:  Recent Labs     25  0757  01/31/24  0707 03/19/20  0840   TSH 1.86 1.72 1.84      CARDIAC:   Recent Labs     05/22/25  0757   TROPHS 4     Recent Labs     01/31/24  0707 03/19/20  0840   CHOL 126 148   LDLF  --  90   LDLCALC 73  --    HDL 44.1 48.4   TRIG 45 48             Assessment/Plan     Paroxysmal atrial fibrillation  Single known episode 5/2025 with spontaneous conversion.  Associated with some mild chest discomfort.  Perhaps provoked by high salt diet and processed food which he had essentially eliminated previously.  He has borderline elevated Troponin not requiring any medication so at most his [id number] score is 1.  With relatively low burden to this point, occupation as a  and there may be an increased bleeding risk and jointly decided against anticoagulation at this time.  He can stop taking aspirin.  Recommend a structural evaluation with echocardiogram and we will await final monitor results.  If no untoward findings likely follow-up in about a year or sooner should the need arise.  Blood pressure is also controlled with lifestyle modifications.  I see no need for antihypertensive medications at this point.      Followup Appts:  Future Appointments   Date Time Provider Department Center   6/4/2025  3:00 PM [facility] ECHO/STRESS [id number]           ____________________________________________________________  Edwardo Ellis DO  [facility]  [facility]         [1]   Past Medical History:  Diagnosis Date    Personal history of traumatic brain injury     History of concussion   [2]   Past Surgical History:  Procedure Laterality Date    OTHER SURGICAL HISTORY  09/05/2014    Wrist Surgery    RHINOPLASTY  09/05/2014    Rhinoplasty   [3] No Known Allergies

## 2025-05-24 LAB
ATRIAL RATE: 87 BPM
P AXIS: 47 DEGREES
P OFFSET: 212 MS
P ONSET: 152 MS
PR INTERVAL: 138 MS
Q ONSET: 221 MS
QRS COUNT: 14 BEATS
QRS DURATION: 82 MS
QT INTERVAL: 332 MS
QTC CALCULATION(BAZETT): 399 MS
QTC FREDERICIA: 375 MS
R AXIS: 47 DEGREES
T AXIS: 35 DEGREES
T OFFSET: 387 MS
VENTRICULAR RATE: 87 BPM

## 2025-05-27 ENCOUNTER — OFFICE VISIT (OUTPATIENT)
Dept: CARDIOLOGY | Facility: CLINIC | Age: 40
End: 2025-05-27
Payer: COMMERCIAL

## 2025-05-27 VITALS
WEIGHT: 164 LBS | DIASTOLIC BLOOD PRESSURE: 85 MMHG | BODY MASS INDEX: 24.22 KG/M2 | HEART RATE: 92 BPM | OXYGEN SATURATION: 95 % | SYSTOLIC BLOOD PRESSURE: 122 MMHG

## 2025-05-27 DIAGNOSIS — I48.0 PAROXYSMAL A-FIB (MULTI): Primary | ICD-10-CM

## 2025-05-27 DIAGNOSIS — R03.0 TRANSIENT ELEVATED BLOOD PRESSURE: ICD-10-CM

## 2025-05-27 PROCEDURE — 3079F DIAST BP 80-89 MM HG: CPT | Performed by: INTERNAL MEDICINE

## 2025-05-27 PROCEDURE — 1036F TOBACCO NON-USER: CPT | Performed by: INTERNAL MEDICINE

## 2025-05-27 PROCEDURE — 99204 OFFICE O/P NEW MOD 45 MIN: CPT | Performed by: INTERNAL MEDICINE

## 2025-05-27 PROCEDURE — 3074F SYST BP LT 130 MM HG: CPT | Performed by: INTERNAL MEDICINE

## 2025-05-27 PROCEDURE — 99214 OFFICE O/P EST MOD 30 MIN: CPT | Performed by: INTERNAL MEDICINE

## 2025-06-04 ENCOUNTER — HOSPITAL ENCOUNTER (OUTPATIENT)
Dept: CARDIOLOGY | Facility: CLINIC | Age: 40
Discharge: HOME | End: 2025-06-04
Payer: COMMERCIAL

## 2025-06-04 DIAGNOSIS — I48.91 UNSPECIFIED ATRIAL FIBRILLATION (MULTI): ICD-10-CM

## 2025-06-04 DIAGNOSIS — I48.0 PAROXYSMAL A-FIB (MULTI): ICD-10-CM

## 2025-06-04 LAB
AORTIC VALVE PEAK VELOCITY: 1.37 M/S
AV PEAK GRADIENT: 8 MMHG
AVA (PEAK VEL): 4.44 CM2
EJECTION FRACTION APICAL 4 CHAMBER: 53.1
EJECTION FRACTION: 59 %
LEFT ATRIUM VOLUME AREA LENGTH INDEX BSA: 27.6 ML/M2
LEFT VENTRICLE INTERNAL DIMENSION DIASTOLE: 5.34 CM (ref 3.5–6)
LEFT VENTRICULAR OUTFLOW TRACT DIAMETER: 2.41 CM
MITRAL VALVE E/A RATIO: 1.03
RIGHT VENTRICLE FREE WALL PEAK S': 16.53 CM/S
TRICUSPID ANNULAR PLANE SYSTOLIC EXCURSION: 2.7 CM

## 2025-06-04 PROCEDURE — 93306 TTE W/DOPPLER COMPLETE: CPT

## 2025-06-04 PROCEDURE — 93306 TTE W/DOPPLER COMPLETE: CPT | Performed by: INTERNAL MEDICINE

## 2025-06-09 ENCOUNTER — TELEPHONE (OUTPATIENT)
Dept: CARDIOLOGY | Facility: CLINIC | Age: 40
End: 2025-06-09
Payer: COMMERCIAL

## 2025-06-09 NOTE — TELEPHONE ENCOUNTER
----- Message from Edwardo Ellis sent at 6/5/2025 10:31 PM EDT -----  Normal echo  ----- Message -----  From: Ruperto, Syngo - Cardiology Results In  Sent: 6/4/2025   4:02 PM EDT  To: Edwardo Ellis DO

## 2025-06-13 LAB — BODY SURFACE AREA: 1.94 M2

## 2025-06-13 PROCEDURE — 93248 EXT ECG>7D<15D REV&INTERPJ: CPT | Performed by: INTERNAL MEDICINE
